# Patient Record
Sex: MALE | Race: OTHER
[De-identification: names, ages, dates, MRNs, and addresses within clinical notes are randomized per-mention and may not be internally consistent; named-entity substitution may affect disease eponyms.]

---

## 2018-12-03 ENCOUNTER — HOSPITAL ENCOUNTER (EMERGENCY)
Dept: HOSPITAL 42 - ED | Age: 43
Discharge: LEFT BEFORE DISCHARGE | End: 2018-12-03
Payer: SELF-PAY

## 2018-12-03 VITALS
RESPIRATION RATE: 18 BRPM | OXYGEN SATURATION: 99 % | SYSTOLIC BLOOD PRESSURE: 118 MMHG | TEMPERATURE: 98.8 F | HEART RATE: 72 BPM | DIASTOLIC BLOOD PRESSURE: 77 MMHG
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
## 2018-12-03 DIAGNOSIS — S60.417A: Primary | ICD-10-CM

## 2018-12-03 DIAGNOSIS — S60.052A: ICD-10-CM

## 2018-12-03 NOTE — ED PDOC
Arrival/HPI





- General


Chief Complaint: Finger,Hand,&Wrist


Historian: Patient





- History of Present Illness


Narrative History of Present Illness (Text): 





12/03/18 00:24


42 y/o male, last tetanus under 7 years ago, nkda, no significant pmh, c/o lt. 

hand 5th digit injury x 2  hours.  Pt. stated that he smashed the bathroom door 

against the left hand 5th digit accidentally, aching pain, sustained abrasion, 

no laceration, no numbness or tingling, no night sweat, no other medical or 

psychological complaints. 





Past Medical History





- Provider Review


Nursing Documentation Reviewed: Yes





- Cardiac


Hx Cardiac Disorders: No





- Psychiatric


Hx Substance Use: No





Family/Social History





- Physician Review


Nursing Documentation Reviewed: Yes


Family/Social History: Unknown Family HX


Smoking Status: Never Smoked


Hx Alcohol Use: Yes


Frequency of alcohol use: Socially


Hx Substance Use: No





Allergies/Home Meds


Allergies/Adverse Reactions: 


Allergies





No Known Allergies Allergy (Verified 12/03/18 00:21)


   











Review of Systems





- Review of Systems


Constitutional: absent: Fatigue, Fevers, Night Sweats


Eyes: absent: Vision Changes


ENT: absent: Hearing Changes, Rhinorrhea


Respiratory: absent: SOB, Cough


Cardiovascular: absent: Chest Pain


Gastrointestinal: absent: Abdominal Pain, Diarrhea, Nausea, Vomiting


Musculoskeletal: Arthralgias, Joint Swelling.  absent: Back Pain, Myalgias


Skin: Other (+abrasion).  absent: Rash, Pruritis, Laceration


Neurological: absent: Headache, Dizziness


Psychiatric: absent: Anxiety, Depression, Suicidal Ideation





Physical Exam


Vital Signs Reviewed: Yes





Vital Signs











  Temp Pulse Resp BP Pulse Ox


 


 12/03/18 00:21  98.8 F  72  18  118/77  99











Temperature: Afebrile


Blood Pressure: Normal


Pulse: Regular


Respiratory Rate: Normal


Appearance: Positive for: Well-Appearing, Non-Toxic, Comfortable


Pain Distress: Moderate


Mental Status: Positive for: Alert and Oriented X 3





- Systems Exam


Head: Present: Atraumatic, Normocephalic


Pupils: Present: PERRL


Extroacular Muscles: Present: EOMI


Conjunctiva: Present: Normal


Mouth: Present: Moist Mucous Membranes


Neck: Present: Normal Range of Motion


Respiratory/Chest: Present: Clear to Auscultation, Good Air Exchange.  No: 

Respiratory Distress, Accessory Muscle Use


Cardiovascular: Present: Regular Rate and Rhythm, Normal S1, S2.  No: Murmurs


Abdomen: No: Tenderness, Distention, Peritoneal Signs


Back: Present: Normal Inspection


Upper Extremity: Present: Normal Inspection, Other (Lt. hand 5th digit: +ttp on 

the distal tuft and DIPJ region with superficial abrasion on the middle phalanx 

dorsum region approx. 1cm diameter but no laceration gap, FROM without 

limitation, sensation intact, motor 5/5, +radial pulse, capillary refill< 2 

seconds, neurovascular intact. ).  No: Cyanosis, Edema


Lower Extremity: Present: Normal Inspection.  No: Edema


Neurological: Present: GCS=15, CN II-XII Intact, Speech Normal


Skin: Present: Warm, Dry, Normal Color.  No: Rashes


Psychiatric: Present: Alert, Oriented x 3, Normal Insight, Normal Concentration





Medical Decision Making


ED Course and Treatment: 





12/03/18 00:27


-lt. hand xray


-motrin





12/03/18 01:23


-xray show no fracture or dislocation. 


-Pt. is vitally stable, talking to wife, demanding for percocet with the wife 

which I told them we would start with ice pack and nsaids.  There is no fracture

and no indication for percocet. 


-I would irrigated with normal saline, clean with betadine, bacitracin and gauze

dressing, finger splint applied with neurovascular intact.  Pt. and wife wants 

more pain med, percocet ordered. 





12/03/18 01:42


-Pt. put on the clothing and left the ER after I declined to give percocet as 

prescription.  He would be placed as elopement. 





- RAD Interpretation


Radiology Orders: 











12/03/18 00:24


HAND LEFT 5TH DIGIT (FINGER) [RAD] Stat 








negative study


: Radiologist





- PA / NP / Resident Statement


MD/DO has reviewed & agrees with the documentation as recorded.





Disposition/Present on Arrival





- Present on Arrival


Any Indicators Present on Arrival: No


History of DVT/PE: No


History of Uncontrolled Diabetes: No


Urinary Catheter: No


History of Decub. Ulcer: No


History Surgical Site Infection Following: None





- Disposition


Have Diagnosis and Disposition been Completed?: Yes


Diagnosis: 


 Abrasion of finger, Contusion, finger





Disposition: ELOPEMENT - ER ONLY


Disposition Time: 01:42


Patient Problems: 


                             Current Active Problems











Problem Status Onset


 


Abrasion of finger Acute 


 


Contusion, finger Acute 











Condition: STABLE


Referrals: 


[facility] at [facility] [Outside] - Follow up with primary


Nacho Barr III, MD [Medical Doctor] - Follow up with primary


Forms:  [email or web address] (English)

## 2018-12-03 NOTE — RAD
PROCEDURE:  Left Hand and 5th digit radiographs.



HISTORY:

 lt. hand 5th DIPJ injury 



COMPARISON:

None.



FINDINGS:



BONES:

Normal. No fracture. 



JOINTS:

Normal. No osteoarthritic changes. 



SOFT TISSUES:

Normal. 



OTHER FINDINGS:

None.



IMPRESSION:

Negative study